# Patient Record
Sex: MALE | Race: WHITE | Employment: STUDENT | RURAL
[De-identification: names, ages, dates, MRNs, and addresses within clinical notes are randomized per-mention and may not be internally consistent; named-entity substitution may affect disease eponyms.]

---
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
## 2025-02-20 ENCOUNTER — OFFICE VISIT (OUTPATIENT)
Age: 25
End: 2025-02-20
Payer: COMMERCIAL

## 2025-02-20 VITALS
BODY MASS INDEX: 23.79 KG/M2 | DIASTOLIC BLOOD PRESSURE: 76 MMHG | HEIGHT: 68 IN | OXYGEN SATURATION: 98 % | SYSTOLIC BLOOD PRESSURE: 109 MMHG | HEART RATE: 66 BPM | WEIGHT: 157 LBS | TEMPERATURE: 98.1 F

## 2025-02-20 DIAGNOSIS — F33.9 EPISODE OF RECURRENT MAJOR DEPRESSIVE DISORDER, UNSPECIFIED DEPRESSION EPISODE SEVERITY: Primary | ICD-10-CM

## 2025-02-20 PROCEDURE — 99213 OFFICE O/P EST LOW 20 MIN: CPT | Performed by: INTERNAL MEDICINE

## 2025-02-20 SDOH — ECONOMIC STABILITY: FOOD INSECURITY: WITHIN THE PAST 12 MONTHS, THE FOOD YOU BOUGHT JUST DIDN'T LAST AND YOU DIDN'T HAVE MONEY TO GET MORE.: NEVER TRUE

## 2025-02-20 SDOH — ECONOMIC STABILITY: FOOD INSECURITY: WITHIN THE PAST 12 MONTHS, YOU WORRIED THAT YOUR FOOD WOULD RUN OUT BEFORE YOU GOT MONEY TO BUY MORE.: NEVER TRUE

## 2025-02-20 ASSESSMENT — COLUMBIA-SUICIDE SEVERITY RATING SCALE - C-SSRS
2. HAVE YOU ACTUALLY HAD ANY THOUGHTS OF KILLING YOURSELF?: NO
6. HAVE YOU EVER DONE ANYTHING, STARTED TO DO ANYTHING, OR PREPARED TO DO ANYTHING TO END YOUR LIFE?: YES
7. DID THIS OCCUR IN THE LAST THREE MONTHS: NO
1. WITHIN THE PAST MONTH, HAVE YOU WISHED YOU WERE DEAD OR WISHED YOU COULD GO TO SLEEP AND NOT WAKE UP?: YES

## 2025-02-20 ASSESSMENT — PATIENT HEALTH QUESTIONNAIRE - PHQ9
9. THOUGHTS THAT YOU WOULD BE BETTER OFF DEAD, OR OF HURTING YOURSELF: SEVERAL DAYS
5. POOR APPETITE OR OVEREATING: NOT AT ALL
1. LITTLE INTEREST OR PLEASURE IN DOING THINGS: NEARLY EVERY DAY
SUM OF ALL RESPONSES TO PHQ QUESTIONS 1-9: 14
SUM OF ALL RESPONSES TO PHQ9 QUESTIONS 1 & 2: 6
SUM OF ALL RESPONSES TO PHQ QUESTIONS 1-9: 14
8. MOVING OR SPEAKING SO SLOWLY THAT OTHER PEOPLE COULD HAVE NOTICED. OR THE OPPOSITE, BEING SO FIGETY OR RESTLESS THAT YOU HAVE BEEN MOVING AROUND A LOT MORE THAN USUAL: NOT AT ALL
6. FEELING BAD ABOUT YOURSELF - OR THAT YOU ARE A FAILURE OR HAVE LET YOURSELF OR YOUR FAMILY DOWN: NEARLY EVERY DAY
4. FEELING TIRED OR HAVING LITTLE ENERGY: NEARLY EVERY DAY
2. FEELING DOWN, DEPRESSED OR HOPELESS: NEARLY EVERY DAY
SUM OF ALL RESPONSES TO PHQ QUESTIONS 1-9: 13
7. TROUBLE CONCENTRATING ON THINGS, SUCH AS READING THE NEWSPAPER OR WATCHING TELEVISION: SEVERAL DAYS
3. TROUBLE FALLING OR STAYING ASLEEP: NOT AT ALL
SUM OF ALL RESPONSES TO PHQ QUESTIONS 1-9: 14
10. IF YOU CHECKED OFF ANY PROBLEMS, HOW DIFFICULT HAVE THESE PROBLEMS MADE IT FOR YOU TO DO YOUR WORK, TAKE CARE OF THINGS AT HOME, OR GET ALONG WITH OTHER PEOPLE: EXTREMELY DIFFICULT

## 2025-02-20 NOTE — PROGRESS NOTES
[facility]     Date of Visit:  2025  Patient Name: Delmar Truong   Patient :  2000  MRN:  [id number]      CHIEF COMPLAINT:     Delmar Truong is a 24 y.o. male who is established who presents for:  Chief Complaint   Patient presents with    [facility]     Patient is here today to [facility].        HISTORY OF PRESENT ILLNESS:       HPI   Patient is into the clinic today specifically requesting GeneSight testing.  He has a long history of ongoing major depression.  He attempted suicide when he was 18 years old, tried to jump off of a bridge.  He has tried multiple medications in the past and has had no improvement of symptoms with various trials of different medications.  He is following with Sharri Fernando with behavioral health in [place] who is managing his care.  She recommended he get GeneSight testing.  He admits to suicidal thoughts currently, feeling like he would be better off dead.  But he denies active plan or intent to kill himself.  His mother who is supportive is present in the room.  He says he would reach out for help if suicidal symptoms became more severe.  He denies a history of pressured speech, impulsive shopping, drug abuse, insomnia.  Mother does note concerns that the patient may have undiagnosed autism spectrum disorder.  He has never been evaluated medically for this.  REVIEW OF SYSTEMS:      Review of Systems   Psychiatric/Behavioral:  Positive for dysphoric mood and suicidal ideas. The patient is not hyperactive.         REVIEWED INFORMATION      No current outpatient medications on file.     No current facility-administered medications for this visit.        No Known Allergies    There is no problem list on file for this patient.      Past Medical History:   Diagnosis Date    Anxiety     Bipolar 1 disorder (HCC)     Depression        No past surgical history on file.     Social History     Socioeconomic History    Marital status: Single   Tobacco Use

## 2025-02-20 NOTE — PROGRESS NOTES
Chief Complaint   Patient presents with    [facility]     Patient is here today to [facility].        Have you seen any other physician or provider since your last visit no    Have you had any other diagnostic tests since your last visit? no    Have you changed or stopped any medications since your last visit? no

## 2025-05-05 ENCOUNTER — OFFICE VISIT (OUTPATIENT)
Age: 25
End: 2025-05-05
Payer: COMMERCIAL

## 2025-05-05 VITALS
OXYGEN SATURATION: 96 % | BODY MASS INDEX: 23.72 KG/M2 | HEART RATE: 99 BPM | TEMPERATURE: 97.7 F | WEIGHT: 156 LBS | SYSTOLIC BLOOD PRESSURE: 118 MMHG | DIASTOLIC BLOOD PRESSURE: 84 MMHG

## 2025-05-05 DIAGNOSIS — L29.0 PRURITUS ANI: Primary | ICD-10-CM

## 2025-05-05 PROCEDURE — 99214 OFFICE O/P EST MOD 30 MIN: CPT | Performed by: INTERNAL MEDICINE

## 2025-05-05 RX ORDER — HYDROCORTISONE ACETATE 25 MG/1
25 SUPPOSITORY RECTAL EVERY 12 HOURS
Qty: 24 SUPPOSITORY | Refills: 1 | Status: SHIPPED | OUTPATIENT
Start: 2025-05-05

## 2025-05-05 NOTE — PATIENT INSTRUCTIONS
Keep a list of your medicines with you. List all of the prescription medicines, nonprescription medicines, supplements, natural remedies, and vitamins that you take. Tell your healthcare providers who treat you about all of the products you are taking. Your provider can provide you with a form to keep track of them. Just ask.  Follow the directions that come with your medicine, including information about food or alcohol. Make sure you know how and when to take your medicine. Do not take more or less than you are supposed to take.  Keep all medicines out of the reach of children.  Store medicines according to the directions on the label.  Monitor yourself. Learn to know how your body reacts to your new medicine and keep track of how it makes you feel before attempting (If your provider has allowed you to do so) to drive or go to work.   Seek emergency medical attention if you think you have used too much of this medicine. An overdose of any prescription medicine can be fatal. Overdose symptoms may include extreme drowsiness, muscle weakness, confusion, cold and clammy skin, pinpoint pupils, shallow breathing, slow heart rate, fainting, or coma.  Don't share prescription medicines with others, even when they seem to have the same symptoms. What may be good for you may be harmful to others.  If you are no longer taking a prescribed medication and you have pills left please take your pills out of their original containers. Mix crushed pills with an undesirable substance, such as cat litter or used coffee grounds. Put the mixture into a disposable container with a lid, such as an empty margarine tub, or into a sealable bag.  Cover up or remove any of your personal information on the empty containers by covering it with black permanent marker or duct tape.  Place the sealed container with the mixture, and the empty drug containers, in the trash.   If you use a medication that is in the form of a patch,

## 2025-05-05 NOTE — PROGRESS NOTES
Chief Complaint   Patient presents with    Anal Itching     Patient has been having some itching around his anus that he is concerned about, neg diarrhea, neg fever, no blood. No changes in chaudhry detergent of body wash. Patient also has anxiety and is aware of this issue also.          Have you seen any other physician or provider since your last visit no    Have you had any other diagnostic tests since your last visit? no    Have you changed or stopped any medications since your last visit? no

## 2025-05-06 NOTE — PROGRESS NOTES
Delmar Truong (:  2000) is a 24 y.o. male,Established patient, here for evaluation of the following chief complaint(s):  Anal Itching (Patient has been having some itching around his anus that he is concerned about, neg diarrhea, neg fever, no blood. No changes in chaudhry detergent of body wash. Patient also has anxiety and is aware of this issue also. /)      ASSESSMENT/PLAN:  Assessment & Plan  Pruritus ani   The differential diagnosis includes hemorrhoids, infection, pinworms, other. There is no visual or palpable evidence of hemorrhoids or abscesses. The skin around the gluteal cleft is mildly erythematous. A stool sample will be collected for further analysis. Steroid suppositories have been prescribed to be used twice daily. If there is no improvement by next week, he should return for a follow-up visit.    Orders:    hydrocortisone (ANUSOL-HC) 25 MG suppository; Place 1 suppository rectally in the morning and 1 suppository in the evening.    O&P PANEL ([employer]) #1; Future      Return if symptoms worsen or fail to improve.    SUBJECTIVE/OBJECTIVE:  History of Present Illness  The patient is a 24-year-old male who presents due to anal itching.    He reports the onset of anal itching approximately 4 to 5 days ago, with no identifiable cause. He has conducted a self-examination using a mirror but did not observe any abnormalities or bleeding. The itching intensifies during bowel movements and is persistent throughout the day, although it has shown some improvement since its initial onset. He also reports a sensation of swelling and redness in the affected area. He experiences pain immediately after defecation but does not report any burning sensation. He has not observed any worms in his stool or bed. He reports no diarrhea or constipation. He has no history of hemorrhoids and is not sexually active, with no history of anal intercourse. He also reports no presence of mucus or drainage